# Patient Record
Sex: MALE | Race: BLACK OR AFRICAN AMERICAN | NOT HISPANIC OR LATINO | Employment: FULL TIME | URBAN - METROPOLITAN AREA
[De-identification: names, ages, dates, MRNs, and addresses within clinical notes are randomized per-mention and may not be internally consistent; named-entity substitution may affect disease eponyms.]

---

## 2024-07-25 ENCOUNTER — HOSPITAL ENCOUNTER (EMERGENCY)
Facility: HOSPITAL | Age: 49
Discharge: HOME | End: 2024-07-25
Attending: EMERGENCY MEDICINE
Payer: COMMERCIAL

## 2024-07-25 ENCOUNTER — APPOINTMENT (OUTPATIENT)
Dept: RADIOLOGY | Facility: HOSPITAL | Age: 49
End: 2024-07-25
Payer: COMMERCIAL

## 2024-07-25 VITALS
RESPIRATION RATE: 18 BRPM | OXYGEN SATURATION: 96 % | SYSTOLIC BLOOD PRESSURE: 180 MMHG | DIASTOLIC BLOOD PRESSURE: 130 MMHG | HEART RATE: 86 BPM | TEMPERATURE: 98 F

## 2024-07-25 DIAGNOSIS — Z23 TETANUS-DIPHTHERIA (TD) VACCINATION: ICD-10-CM

## 2024-07-25 DIAGNOSIS — M79.644 FINGER PAIN, RIGHT: ICD-10-CM

## 2024-07-25 DIAGNOSIS — S62.600B OPEN DISPLACED FRACTURE OF PHALANX OF RIGHT INDEX FINGER, UNSPECIFIED PHALANX, INITIAL ENCOUNTER: ICD-10-CM

## 2024-07-25 DIAGNOSIS — S69.91XA FINGER INJURY, RIGHT, INITIAL ENCOUNTER: Primary | ICD-10-CM

## 2024-07-25 PROCEDURE — 90715 TDAP VACCINE 7 YRS/> IM: CPT

## 2024-07-25 PROCEDURE — 99284 EMERGENCY DEPT VISIT MOD MDM: CPT

## 2024-07-25 PROCEDURE — 96372 THER/PROPH/DIAG INJ SC/IM: CPT | Performed by: NURSE PRACTITIONER

## 2024-07-25 PROCEDURE — 73130 X-RAY EXAM OF HAND: CPT | Mod: LEFT SIDE | Performed by: RADIOLOGY

## 2024-07-25 PROCEDURE — 2500000004 HC RX 250 GENERAL PHARMACY W/ HCPCS (ALT 636 FOR OP/ED)

## 2024-07-25 PROCEDURE — 73130 X-RAY EXAM OF HAND: CPT | Mod: LT

## 2024-07-25 PROCEDURE — 99284 EMERGENCY DEPT VISIT MOD MDM: CPT | Mod: 25

## 2024-07-25 PROCEDURE — 12001 RPR S/N/AX/GEN/TRNK 2.5CM/<: CPT

## 2024-07-25 PROCEDURE — 96365 THER/PROPH/DIAG IV INF INIT: CPT | Mod: 59

## 2024-07-25 PROCEDURE — 90471 IMMUNIZATION ADMIN: CPT

## 2024-07-25 PROCEDURE — 2500000004 HC RX 250 GENERAL PHARMACY W/ HCPCS (ALT 636 FOR OP/ED): Performed by: NURSE PRACTITIONER

## 2024-07-25 PROCEDURE — 2500000004 HC RX 250 GENERAL PHARMACY W/ HCPCS (ALT 636 FOR OP/ED): Mod: JZ

## 2024-07-25 PROCEDURE — 2500000001 HC RX 250 WO HCPCS SELF ADMINISTERED DRUGS (ALT 637 FOR MEDICARE OP)

## 2024-07-25 RX ORDER — CEFAZOLIN SODIUM 2 G/100ML
2 INJECTION, SOLUTION INTRAVENOUS ONCE
Status: COMPLETED | OUTPATIENT
Start: 2024-07-25 | End: 2024-07-25

## 2024-07-25 RX ORDER — OXYCODONE AND ACETAMINOPHEN 5; 325 MG/1; MG/1
1 TABLET ORAL ONCE
Status: COMPLETED | OUTPATIENT
Start: 2024-07-25 | End: 2024-07-25

## 2024-07-25 RX ORDER — CEPHALEXIN 500 MG/1
500 CAPSULE ORAL 4 TIMES DAILY
Qty: 40 CAPSULE | Refills: 0 | Status: SHIPPED | OUTPATIENT
Start: 2024-07-25 | End: 2024-08-04

## 2024-07-25 RX ORDER — IBUPROFEN 600 MG/1
600 TABLET ORAL EVERY 6 HOURS PRN
Qty: 28 TABLET | Refills: 0 | Status: SHIPPED | OUTPATIENT
Start: 2024-07-25 | End: 2024-08-01

## 2024-07-25 RX ORDER — KETOROLAC TROMETHAMINE 15 MG/ML
INJECTION, SOLUTION INTRAMUSCULAR; INTRAVENOUS
Status: DISCONTINUED
Start: 2024-07-25 | End: 2024-07-25 | Stop reason: HOSPADM

## 2024-07-25 RX ORDER — OXYCODONE AND ACETAMINOPHEN 5; 325 MG/1; MG/1
1 TABLET ORAL EVERY 6 HOURS PRN
Qty: 5 TABLET | Refills: 0 | Status: SHIPPED | OUTPATIENT
Start: 2024-07-25 | End: 2024-07-28

## 2024-07-25 RX ORDER — BACITRACIN ZINC 500 UNIT/G
OINTMENT IN PACKET (EA) TOPICAL ONCE
Status: DISCONTINUED | OUTPATIENT
Start: 2024-07-25 | End: 2024-07-25 | Stop reason: HOSPADM

## 2024-07-25 RX ORDER — KETOROLAC TROMETHAMINE 15 MG/ML
15 INJECTION, SOLUTION INTRAMUSCULAR; INTRAVENOUS ONCE
Status: COMPLETED | OUTPATIENT
Start: 2024-07-25 | End: 2024-07-25

## 2024-07-25 RX ORDER — LIDOCAINE HYDROCHLORIDE 10 MG/ML
5 INJECTION INFILTRATION; PERINEURAL ONCE
Status: DISCONTINUED | OUTPATIENT
Start: 2024-07-25 | End: 2024-07-25 | Stop reason: HOSPADM

## 2024-07-25 ASSESSMENT — COLUMBIA-SUICIDE SEVERITY RATING SCALE - C-SSRS
1. IN THE PAST MONTH, HAVE YOU WISHED YOU WERE DEAD OR WISHED YOU COULD GO TO SLEEP AND NOT WAKE UP?: NO
2. HAVE YOU ACTUALLY HAD ANY THOUGHTS OF KILLING YOURSELF?: NO
6. HAVE YOU EVER DONE ANYTHING, STARTED TO DO ANYTHING, OR PREPARED TO DO ANYTHING TO END YOUR LIFE?: NO

## 2024-07-25 ASSESSMENT — ENCOUNTER SYMPTOMS
DIFFICULTY URINATING: 0
WOUND: 1
FEVER: 0
HEADACHES: 0
SHORTNESS OF BREATH: 0
ABDOMINAL PAIN: 0
CONFUSION: 0

## 2024-07-25 NOTE — CONSULTS
Reason For Consult  L index finger distal phalanx fx with complex laceration    History Of Present Illness  Gonzales Bearden is a 48 y.o. RHD male presenting with complex laceration and L index finger distal phalanx fx s/p having his finger accidentally sliced when his device slipped while placing poles in his yard earlier today. The pain is constant, severe, and describes a sharp sensation. Denies numbness or tingling. Denies prior injuries ot the L hand.      Past Medical History  He has a past medical history of Bipolar disorder, unspecified (Multi) (10/08/2018) and Thiamine deficiency, unspecified (07/08/2017).    Surgical History  He has a past surgical history that includes Tonsillectomy (07/08/2017) and Mouth surgery (07/08/2017).     Social History  Denies tobacco use  Denies alcohol use  Denies drug use    Family History  No pertinent FHx     Allergies  Patient has no known allergies.    Review of Systems  Review of Systems   Constitutional:  Negative for fever.   HENT:  Negative for congestion.    Eyes:  Negative for visual disturbance.   Respiratory:  Negative for shortness of breath.    Cardiovascular:  Negative for chest pain.   Gastrointestinal:  Negative for abdominal pain.   Genitourinary:  Negative for difficulty urinating.   Musculoskeletal:         + L index finger pain   Skin:  Positive for wound. Negative for rash.   Neurological:  Negative for headaches.   Psychiatric/Behavioral:  Negative for confusion.      Physical Exam  - Constitutional: No acute distress, cooperative  - Eyes: EOM grossly intact  - Head/Neck: Trachea midline  - Respiratory/Thorax: NWOB  - Cardiovascular: RRR on peripheral palpation  - Gastrointestinal: Nondistended  - Psychological: Appropriate mood/behavior  - Skin: Warm and dry. Additional findings in musculoskeletal evaluation  - Musculoskeletal: See below for additional details  LUE:  - Obvious deformity to the distal phalanx of L index finger  - 2cm complex laceration on  dorsal aspect of finger proximal to nail plate extending circumferentially to half of the finger pad  - TTP: Severe TTP to site of injury.   - Motor: +AIN/PIN/ulnar   - SILT: r/u/m distr  - ROM: Able to flex at PIPJ and DIPJ  - Palpable radial pulse, brisk cap refill  - Forearm soft/compressible     Last Recorded Vitals  Blood pressure (!) 180/130, pulse 86, temperature 36.7 °C (98 °F), resp. rate 18, SpO2 96%.    Relevant Results  XR hand left 3+ views    Result Date: 7/25/2024  STUDY: Hand Radiographs; 07/25/2024, 12:22PM INDICATION: Left second digit tenderness with laceration. Evaluate for fracture. COMPARISON: None Available. ACCESSION NUMBER(S): [id number] ORDERING CLINICIAN: STEPHAN WARREN TECHNIQUE:  Three view(s) (six images) of the left hand. FINDINGS:  There is a comminuted fracture of the distal phalanx of the second digit. There is soft tissue swelling. There is a small radiopacity in the soft tissues between first and second digits consistent with a foreign body. Joint spaces are otherwise normal.    Fracture of the distal phalanx of the second digit. Small foreign body in the soft tissues between the first and second digits. Signed by Stephan Pollard MD       Scheduled medications  bacitracin, , Topical, Once  lidocaine, 5 mL, infiltration, Once      Continuous medications     PRN medications    No results found for this or any previous visit (from the past 24 hour(s)).     Assessment/Plan     Assessment: 47 y/o RHD M with complex laceration and comminuted distal phalanx fx of L index finger s/p injury doing yard work prior to arrival. Physical exam remarkable for 2cm complex laceration on dorsal aspect of finger proximal to nail plate extending circumferentially to half of the finger pad. Severe TTP to site of injury. Radiology remarkable for comminuted fracture of the distal phalanx of the second digit. Patient was evaluated in ED.     Procedure: Bedside I&D, Digital Block and Complex Laceration  Repair  Patient verbally consented to verbally consented to bedside I&D and complex lac repair of the left index finger. Bedside I&D performed with normal saline and betadine. Digital block performed at bedside with 7cc 1% Lidocaine w/o epi. Laceration repaired with 2.0 chromic gut which was loosely approximated. Wound wrapped with xeroform, kerlix, and placed in finger splint wrapped in coban. Patient tolerated procedure well.    Plan:  - No acute surgical intervention planned at this time.  - Patient is to keep finger splint clean, dry and intact until follow up visit with Dr. Fish.   - Ancef administered in ED.  - Tetanus updated in ED.  - Recommend 10 days PO Keflex  - NWB L hand until follow-up in clinic   - Pain management per ED  - Orthopedics is signing off, please page with any questions/concerns  - Follow up in Dr[facility] as scheduled on August 6th. Please call [phone] to schedule or confirm appt.    75 minutes spent with patient reviewing objective subjective information, performing physical exam, discussing plan of care; with greater than 50% of that time spent in patient room.    This plan was discussed with attending on call, Dr. Fish.    Lyudmila Webb PA-C  Orthopaedic Trauma Surgery  Available via Epic Chat

## 2024-07-25 NOTE — ED PROVIDER NOTES
HPI   Chief Complaint   Patient presents with    Finger Laceration       Gonzales Bearden is a 48 year old male presenting today with chief complaint of left index finger laceration. PMHx significant for Bipolar 1 disorder, EMA, thiamine deficiency. Patient endorses that he was working in his yard placing poles and slipped and cut his finger about 1 hour ago. He reports that he immediately went in, wrapped the finger, and came to the ED to be evaluated. He denies any other injuries or pain. Does not know if there is any foreign bodies throughout the laceration. Denies cleaning laceration at home. Denies other symptoms including headache, chest pain, SOB, vision changes that may be associated with the elevated blood pressure on arrival. Patient unsure of when last tetanus update was and agreeable to updating today.          Patient History   Past Medical History:   Diagnosis Date    Bipolar disorder, unspecified (Multi) 10/08/2018    Bipolar I disorder    Thiamine deficiency, unspecified 07/08/2017    Thiamine deficiency     Past Surgical History:   Procedure Laterality Date    MOUTH SURGERY  07/08/2017    Oral Surgery Tooth Extraction    TONSILLECTOMY  07/08/2017    Tonsillectomy     No family history on file.  Social History     Tobacco Use    Smoking status: Not on file    Smokeless tobacco: Not on file   Substance Use Topics    Alcohol use: Not on file    Drug use: Not on file       Physical Exam   ED Triage Vitals [07/25/24 1131]   Temperature Heart Rate Respirations BP   36.7 °C (98 °F) 86 18 (!) 180/130      Pulse Ox Temp src Heart Rate Source Patient Position   96 % -- -- --      BP Location FiO2 (%)     -- --       Physical Exam  Vitals and nursing note reviewed.   Constitutional:       General: He is not in acute distress.     Appearance: He is well-developed.   HENT:      Head: Normocephalic and atraumatic.   Eyes:      Conjunctiva/sclera: Conjunctivae normal.   Cardiovascular:      Rate and Rhythm: Normal  rate and regular rhythm.      Heart sounds: No murmur heard.  Pulmonary:      Effort: Pulmonary effort is normal. No respiratory distress.      Breath sounds: Normal breath sounds.   Abdominal:      Palpations: Abdomen is soft.      Tenderness: There is no abdominal tenderness.   Musculoskeletal:         General: No swelling.      Cervical back: Neck supple.      Comments: Right circumferential index finger laceration with involvement of the nail bed. No obvious visualized foreign bodies. No obvious compound fracture or deformity.    Skin:     General: Skin is warm and dry.      Capillary Refill: Capillary refill takes less than 2 seconds.   Neurological:      Mental Status: He is alert.   Psychiatric:         Mood and Affect: Mood normal.           ED Course & MDM                        Jerod Coma Scale Score: 15                        Medical Decision Making  Gonzales Bearden is a 48 year old male presenting with right index finger laceration that happened 1 hour ago. PMHx significant for bipolar 1 disorder, EMA, and thiamine deficiency. Vitals reviewed and remarkable for 180/130 BP, patient asymptomatic without headache, vision changes, dizziness, abdominal pain. Physical exam reveals patient sitting comfortably holding his left hand with his right hand, A&Ox3, no obvious acute distress, speaking in full sentences, a right circumferential index finger laceration with involvement of the nail bed. Gave Percocet for pain control. Ordered XR left hand which revealed Fracture of the distal phalanx of the second digit.because of open fracture hand was consulted.  patient agreeable to update tetanus, Boostrix given. Hand consulted for evaluation and recommendations for repair.  Hand was soaked and hand team was able to come see the patient.  Please see their note for full details and recommendations.  At the end of my shift patient was still being evaluated by hand.  He remained calm and cooperative in stable condition.   Handed off to oncoming provider pending lab Urban.        Procedure  Procedures     Mark Pascal, TRUDY-ZAKIYA  07/25/24 1401       TRUDY Green-ZAKIYA  07/25/24 1412

## 2024-07-25 NOTE — DISCHARGE INSTRUCTIONS
You were seen in the emergency department for complaints of right finger injury.    You underwent x-ray imaging showing an open fracture, fracture which is of the bone, which opened through the skin    You were seen by hand surgery, who cleaned the wound, and repaired it.    You were provided with antibiotics as well as pain medicine here in the emergency department    You will be discharged on oral antibiotics as well as pain medicine, you may use the pain medicine in addition to  Motrin which has been prescribed.    Please follow-up with the hand surgeon as advised and scheduled.  If you have any worsening signs or symptoms, you may follow-up with your primary care or return to the emergency department.

## 2024-07-25 NOTE — PROGRESS NOTES
Emergency Medicine Transition of Care Note.    I received Radha Espitia in signout from Mark CALLE.  Please see the previous ED provider note for all HPI, PE and MDM up to the time of signout at 1400. This is in addition to the primary record.    In brief Radha Espitia is an 48 y.o. male presenting for   Chief Complaint   Patient presents with    Finger Laceration     At the time of signout we were awaiting: Consultation by hand surgery, and reassessment, for recommendations upon discharge.    Diagnoses as of 07/25/24 1609   Finger injury, right, initial encounter   Open displaced fracture of phalanx of right index finger, unspecified phalanx, initial encounter   Tetanus-diphtheria (Td) vaccination   Finger pain, right       Results for orders placed or performed in visit on 11/09/22   CONVERTED SURGICAL PATHOLOGY   Result Value Ref Range    Pathology Report       Name RADHA ESPITIA                                                                                                   Accession #: [id number]            Pathologist:                   ARMANDO AWAN MD  Date of Procedure:    11/9/2022  Date Received:          11/10/2022  Date Reported           11/16/2022  Submitting Physician:   ANDREW LINARES MD  Location:                    [facility]   Copy To/Referring/Attending:  SAMUEL TADEO MD Other External #                                                                    FINAL DIAGNOSIS  A.  COLON, ASCENDING, POLYP X1, TRANSVERSE, POLYP X1:    -- FRAGMENTS OF TUBULAR ADENOMA.    B.  COLON, SIGMOID, POLYP:   -- FRAGMENTS OF TUBULAR ADENOMA.                                                                                                                                                                                                                                                                                                                                                                                       "                                                                                                  Electronically Signed Out By ARMANDO AWAN MD/MELISSA  By the signature on this report, the individual or group listed as making the  Final Interpretation/Diagnosis certifies that they have reviewed this case.  Diagnostic interpretation performed at [facility] [address]  [facility]. [place] [address]           Clinical History:  Screening    Specimens Submitted As:  A: ASCENDING COLON POLYP X1, TRANSVERSE COLON POLYP X1   B: SIGMOID COLON POLYP     Gross Description:  A:  Received in formalin, labeled with the patient's name and hospital number  and \"A\", are multiple fragments of tan, soft tissue aggregating to 1.2 x 1.2 x  0.3 cm.  The specimen is submitted in toto in one cassette.  RCC    B:  Received in formalin, labeled with the patient's name and hospital number  and \"B\", is a fragment of tan, soft  tissue measuring 0.7 x 0.3 x 0.2 cm.  The  specimen is submitted in toto in one cassette.  RCC        rcc/11/15/2022               [facility]  Department of Pathology   [address] [place] [address]        CONVERTED FINAL DIAGNOSIS       A.  COLON, ASCENDING, POLYP X1, TRANSVERSE, POLYP X1:    -- FRAGMENTS OF TUBULAR ADENOMA.    B.  COLON, SIGMOID, POLYP:   -- FRAGMENTS OF TUBULAR ADENOMA.        CONVERTED CLINICAL DIAGNOSIS-HISTORY Screening     CONVERTED GROSS DESCRIPTION       A:  Received in formalin, labeled with the patient's name and hospital number  and \"A\", are multiple fragments of tan, soft tissue aggregating to 1.2 x 1.2 x  0.3 cm.  The specimen is submitted in toto in one cassette.  RCC    B:  Received in formalin, labeled with the patient's name and hospital number  and \"B\", is a fragment of tan, soft tissue measuring 0.7 x 0.3 x 0.2 cm.  The  specimen is submitted in toto in one cassette.  RCC          CONVERTED FINAL REPORT PDF LINK TO COPY " AND PASTE \\copathshare\copath\PDF 2022_[id number]        Medical Decision Making  Patient is a 48-year-old male, presenting to the ED with finger laceration, underwent x-ray imaging, showing open fracture.  Patient was provided with analgesics, tetanus vaccination as well as IV Ancef.  Patient was seen by hand surgery, who did laceration repair.  They are recommending patient be discharged on Keflex for 14 days, naproxen, as well as oxycodone.  These prescriptions will be provided to the patient.  Patient will be provided with IM Toradol while here and be discharged home.  Home-going instructions and finger care were provided to the patient and family at bedside    Final diagnoses:   [S69.91XA] Finger injury, right, initial encounter   [S62.600B] Open displaced fracture of phalanx of right index finger, unspecified phalanx, initial encounter   [Z23] Tetanus-diphtheria (Td) vaccination   [M79.644] Finger pain, right       DISPOSITION  Disposition: Discharge to home  Patient condition is stable    Darnell Chen, APRN-CNP

## 2024-07-25 NOTE — ED TRIAGE NOTES
Patient was at working in a yard placing poles when a device slipped and sliced his finger. Right pointer finger has laceration. Bleeding controlled in triage. Finger wrapped with gauze. No additional injuries. Patient denies hx of HTN, states he is on pain meds for a tooth extraction which makes his BP rise. Patient asymptomatic (no headache, dizziness, chest pain, SOB).

## 2024-08-05 DIAGNOSIS — M79.642 HAND PAIN, LEFT: Primary | ICD-10-CM

## 2024-08-06 ENCOUNTER — HOSPITAL ENCOUNTER (OUTPATIENT)
Dept: RADIOLOGY | Facility: HOSPITAL | Age: 49
Discharge: HOME | End: 2024-08-06
Payer: COMMERCIAL

## 2024-08-06 ENCOUNTER — OFFICE VISIT (OUTPATIENT)
Dept: ORTHOPEDIC SURGERY | Facility: HOSPITAL | Age: 49
End: 2024-08-06
Payer: COMMERCIAL

## 2024-08-06 VITALS — HEIGHT: 73 IN | WEIGHT: 195 LBS | BODY MASS INDEX: 25.84 KG/M2

## 2024-08-06 DIAGNOSIS — S62.661B NONDISPLACED FRACTURE OF DISTAL PHALANX OF LEFT INDEX FINGER, INITIAL ENCOUNTER FOR OPEN FRACTURE: Primary | ICD-10-CM

## 2024-08-06 DIAGNOSIS — M79.642 HAND PAIN, LEFT: ICD-10-CM

## 2024-08-06 PROCEDURE — 3008F BODY MASS INDEX DOCD: CPT | Performed by: ORTHOPAEDIC SURGERY

## 2024-08-06 PROCEDURE — 73130 X-RAY EXAM OF HAND: CPT | Mod: LEFT SIDE | Performed by: RADIOLOGY

## 2024-08-06 PROCEDURE — 73130 X-RAY EXAM OF HAND: CPT | Mod: LT

## 2024-08-06 PROCEDURE — 99213 OFFICE O/P EST LOW 20 MIN: CPT | Performed by: ORTHOPAEDIC SURGERY

## 2024-08-06 PROCEDURE — 99203 OFFICE O/P NEW LOW 30 MIN: CPT | Performed by: ORTHOPAEDIC SURGERY

## 2024-08-06 RX ORDER — HYDROCODONE BITARTRATE AND ACETAMINOPHEN 5; 325 MG/1; MG/1
1 TABLET ORAL EVERY 6 HOURS PRN
Qty: 28 TABLET | Refills: 0 | Status: SHIPPED | OUTPATIENT
Start: 2024-08-06 | End: 2024-08-13

## 2024-08-06 ASSESSMENT — PAIN SCALES - GENERAL: PAINLEVEL_OUTOF10: 5 - MODERATE PAIN

## 2024-08-06 ASSESSMENT — PAIN DESCRIPTION - DESCRIPTORS: DESCRIPTORS: ACHING;SORE

## 2024-08-06 ASSESSMENT — PAIN - FUNCTIONAL ASSESSMENT: PAIN_FUNCTIONAL_ASSESSMENT: 0-10

## 2024-08-06 NOTE — PROGRESS NOTES
Patient is a 48-year-old right-hand-dominant gentleman who presents today for ER follow-up.  He injured his left index finger at work on July 25 resulting in a fracture of the distal phalanx with soft tissue injury dorsally through the proximal eponychial fold.  The wound was irrigated and closed in the emergency room on day of injury.  He was discharged home on oral antibiotics which have been completed.  He is having some swelling and pain.  He has been performing wound care with dressing changes daily.  He has been working with restrictions.    Patient's self reported past medical history, medications, allergies, surgical history, family and social history as well as a 10 point review of systems has been documented in the new patient intake form and scanned into the patient's electronic medical record. Pertinent findings are documented in the HPI.    Physical Examination Findings:  Constitutional: Appears well-developed and well-nourished.  Head: Normocephalic and atraumatic.  Eyes: Pupils are equal and round.  Cardiovascular: Intact distal pulses.   Respiratory: Effort normal. No respiratory distress.  Neurologic: Alert and oriented to person, place, and time.  Skin: Skin is warm and dry.  Hematologic / Lymphatic: No lymphedema, lymphangitis.  Psychiatric: normal mood and affect. Behavior is normal.   Musculoskeletal: Left hand examination reveals healing traumatic wound along the radial and dorsal aspect of the index finger involving the proximal eponychial fold.  Mild circumferential swelling.  No evidence of active infection.  Fingertip is well-perfused and sensate to light touch.  He has some hyperpigmented lesions at the tip of his middle finger from a remote history of an injury to that finger many years ago.    X-rays left hand taken today demonstrate a comminuted but minimally displaced fracture of the index finger distal phalanx.    Impression: Crush injury left index finger.    Plan: Overall I think  everything looks good.  We have discussed that he is likely always can have a little bit of nail deformity and perhaps some stiffness at the DIP joint.  Recommendations are for wound care and protective wrapping with Coban for support.  He should avoid forceful pinch with the index finger.  I did send a prescription for hydrocodone to his pharmacy.  He will return in 2 weeks for repeat clinical examination with wound check.  No x-rays needed at that time.    Brayan Fish MD    [facility] [employer]  Department of Orthopaedic Surgery  Chief of Hand and Upper Extremity Surgery  [facility]    Dictation performed with the use of voice recognition software. Syntax and grammatical errors may exist.

## 2024-08-18 NOTE — PROGRESS NOTES
[facility]  Hand and Upper Extremity Service  Follow up visit         Follow up for: Left index finger     Interval History: He returns to follow up for his left index finger. 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 He wears Alumafoam splint and reinforced with coban tape. He removes it all when he gets home from work to let everything air out. He still has a fair amount of pain at index finger and has run out of pain medications.               Past medical history, medications, allergies, surgical history and review of systems are reviewed and otherwise unchanged when compared to last visit on 8/6/24         Examination:  Constitutional: Oriented to person, place, and time.  Appears well-developed and well-nourished.  Head: Normocephalic and atraumatic.  Eyes: Pupils are equal, round, and reactive to light.  Cardiovascular: Intact distal pulses.  Pulmonary/Chest/Breast: Effort normal. No respiratory distress.  Neurological: Alert and oriented to person, place, and time.  Skin: Skin is warm and dry.  Psychiatric: normal mood and affect.  Behavior is normal.  Musculoskeletal: Left hand reveals circumferential swelling on terminal aspect of index finger. Superficial wound on dorsal ulnar aspect of eponychial fold with visible granulation tissue but no evidence of any obvious infection or drainage. Diffuse tenderness to palpation and with attempted DIP joint range of motion but fingertip is well profuse.       Personal Interpretation of Diagnostic studies: No new images obtained       Impression: Sequela crush injury left index finger       Plan: We're going to have him continue with splinting and wound care as previously described. We renewed his prescription for hydrocodone and he'll return in 3 weeks for repeat clinical examination with xrays of left index finger.       Medications Prescribed: hydrocodone 325 mg      Follow up: 3 weeks             Brayan Fish,  MD  [facility]  Department of Orthopaedic Surgery  Hand and Upper Extremity Reconstruction    Manjula Attestation  By signing my name below, I, Manjula Suarez   attest that this documentation has been prepared under the direction and in the presence of Dr. Brayan Fish.    Dictation performed with the use of voice recognition software.  Syntax and grammatical errors may exist.   5

## 2024-08-20 ENCOUNTER — OFFICE VISIT (OUTPATIENT)
Dept: ORTHOPEDIC SURGERY | Facility: HOSPITAL | Age: 49
End: 2024-08-20
Payer: COMMERCIAL

## 2024-08-20 VITALS — WEIGHT: 195 LBS | BODY MASS INDEX: 25.84 KG/M2 | HEIGHT: 73 IN

## 2024-08-20 DIAGNOSIS — S62.661B NONDISPLACED FRACTURE OF DISTAL PHALANX OF LEFT INDEX FINGER, INITIAL ENCOUNTER FOR OPEN FRACTURE: ICD-10-CM

## 2024-08-20 PROCEDURE — 3008F BODY MASS INDEX DOCD: CPT | Performed by: ORTHOPAEDIC SURGERY

## 2024-08-20 PROCEDURE — 99213 OFFICE O/P EST LOW 20 MIN: CPT | Performed by: ORTHOPAEDIC SURGERY

## 2024-08-20 RX ORDER — HYDROCODONE BITARTRATE AND ACETAMINOPHEN 5; 325 MG/1; MG/1
1 TABLET ORAL EVERY 6 HOURS PRN
Qty: 28 TABLET | Refills: 0 | Status: SHIPPED | OUTPATIENT
Start: 2024-08-20 | End: 2024-08-27

## 2024-08-20 ASSESSMENT — PAIN DESCRIPTION - DESCRIPTORS: DESCRIPTORS: ACHING;SORE

## 2024-08-20 ASSESSMENT — PAIN SCALES - GENERAL: PAINLEVEL_OUTOF10: 5 - MODERATE PAIN

## 2024-08-20 ASSESSMENT — PAIN - FUNCTIONAL ASSESSMENT: PAIN_FUNCTIONAL_ASSESSMENT: 0-10

## 2024-09-06 NOTE — PROGRESS NOTES
[facility]  Hand and Upper Extremity Service  Follow up visit         Follow up for: Left hand     Interval History: He returns for follow up of his left index finger crush injury. He's overall doing well but still having symptomatic instability of index fingertip.               Past medical history, medications, allergies, surgical history and review of systems are reviewed and otherwise unchanged when compared to last visit on 8/20/24         Examination:  Constitutional: Oriented to person, place, and time.  Appears well-developed and well-nourished.  Head: Normocephalic and atraumatic.  Eyes: Pupils are equal, round, and reactive to light.  Cardiovascular: Intact distal pulses.  Pulmonary/Chest/Breast: Effort normal. No respiratory distress.  Neurological: Alert and oriented to person, place, and time.  Skin: Skin is warm and dry.  Psychiatric: normal mood and affect.  Behavior is normal.  Musculoskeletal: Left hand reveals all traumatic wounds are well healed. No sign of infection. He has partially avulsed the proximal radial corner of nail plate. Mid portion of nail plate is still adherent to nail bed. Instability to radial ulnar stress to distal part of finger.       Personal Interpretation of Diagnostic studies: X-ray of left index finger taken today demonstrate fracture through the proximal aspect of index finger distal phalanx. Fracture line is still very visible.        Impression: Sequela of index finger distal phalanx fracture       Plan: We discussed he'll likely lose his nail at some point. He should be able to regrow a nail but there will be deformity because of the mechanism of the injury. We've provided him with more Coban tape for support of his distal phalanx. He'll return in 4 weeks for repeat clinical examination and we'll get x-ray of index finger at that time. If there is still growth instability of the fingertip we may have to consider DIP joint fusion to  provide him with more stability to the fingertip.       Follow up: 1 month              Brayan Fish MD  [facility]  Department of Orthopaedic Surgery  Hand and Upper Extremity Reconstruction    Scribe Attestation  By signing my name below, I, Manjula Suarez   attest that this documentation has been prepared under the direction and in the presence of Dr. Brayan Fish.    Dictation performed with the use of voice recognition software.  Syntax and grammatical errors may exist.

## 2024-09-09 DIAGNOSIS — S62.661B NONDISPLACED FRACTURE OF DISTAL PHALANX OF LEFT INDEX FINGER, INITIAL ENCOUNTER FOR OPEN FRACTURE: Primary | ICD-10-CM

## 2024-09-10 ENCOUNTER — OFFICE VISIT (OUTPATIENT)
Dept: ORTHOPEDIC SURGERY | Facility: HOSPITAL | Age: 49
End: 2024-09-10
Payer: COMMERCIAL

## 2024-09-10 ENCOUNTER — HOSPITAL ENCOUNTER (OUTPATIENT)
Dept: RADIOLOGY | Facility: HOSPITAL | Age: 49
Discharge: HOME | End: 2024-09-10
Payer: COMMERCIAL

## 2024-09-10 VITALS — WEIGHT: 195 LBS | HEIGHT: 73 IN | BODY MASS INDEX: 25.84 KG/M2

## 2024-09-10 DIAGNOSIS — S62.661B NONDISPLACED FRACTURE OF DISTAL PHALANX OF LEFT INDEX FINGER, INITIAL ENCOUNTER FOR OPEN FRACTURE: Primary | ICD-10-CM

## 2024-09-10 DIAGNOSIS — S62.661B NONDISPLACED FRACTURE OF DISTAL PHALANX OF LEFT INDEX FINGER, INITIAL ENCOUNTER FOR OPEN FRACTURE: ICD-10-CM

## 2024-09-10 PROCEDURE — 99213 OFFICE O/P EST LOW 20 MIN: CPT | Performed by: ORTHOPAEDIC SURGERY

## 2024-09-10 PROCEDURE — 73140 X-RAY EXAM OF FINGER(S): CPT | Mod: LT

## 2024-09-10 PROCEDURE — 3008F BODY MASS INDEX DOCD: CPT | Performed by: ORTHOPAEDIC SURGERY

## 2024-09-10 PROCEDURE — 73140 X-RAY EXAM OF FINGER(S): CPT | Mod: LEFT SIDE | Performed by: RADIOLOGY

## 2024-09-10 ASSESSMENT — PAIN DESCRIPTION - DESCRIPTORS: DESCRIPTORS: ACHING;SORE

## 2024-09-10 ASSESSMENT — PAIN SCALES - GENERAL: PAINLEVEL_OUTOF10: 5 - MODERATE PAIN

## 2024-09-10 ASSESSMENT — PAIN - FUNCTIONAL ASSESSMENT: PAIN_FUNCTIONAL_ASSESSMENT: 0-10

## 2024-10-13 NOTE — PROGRESS NOTES
[facility]  Hand and Upper Extremity Service  Follow up visit         Follow up for: Left index finger     Interval History: He returns for his left index finger. His primary concern is hypersensitivity and cold intolerance. He reports that the deformity doesn't bother him too much and he doesn't think his finger is moving around at the fracture site as it did previously.               Past medical history, medications, allergies, surgical history and review of systems are reviewed and otherwise unchanged when compared to last visit on 9/10/24         Examination:  Constitutional: Oriented to person, place, and time.  Appears well-developed and well-nourished.  Head: Normocephalic and atraumatic.  Eyes: Pupils are equal, round, and reactive to light.  Cardiovascular: Intact distal pulses.  Pulmonary/Chest/Breast: Effort normal. No respiratory distress.  Neurological: Alert and oriented to person, place, and time.  Skin: Skin is warm and dry.  Psychiatric: normal mood and affect.  Behavior is normal.  Musculoskeletal: Left hand reveals all traumatic wounds are well healed. Slight apex radial coronal plate deformity at distal phalanx. Growing a new nail plate. Full MP and PIP joint range of motion but limited DIP joint range of motion. Hypersensitivity at the fingertip of index finger.       Personal Interpretation of Diagnostic studies: X-rays of left index finger taken today demonstrate continued healing of distal phalanx fracture with minimal deformity in the coronal and sagittal plane. Fracture line is becoming less obvious.        Impression:  Sequela of left index finger crush injury       Plan: We've again discussed treatment options. Fusion of the DIP joint is something that can help with the appearence of the finger but not likely going to change the hypersensitivity and cold intolerance symptoms as a result of crush injury. He indicates he's not able to consider surgery  because he can't take time off work so we'll give this more time. We provided him with a silipost finger sleeve to help with sensitivity issues and dampen the vibration of his work activities. He'll return in 6 weeks for repeat clinical examination and x-rays of left index finger.       Follow up: 6 weeks             Brayan Fish MD  [facility]  Department of Orthopaedic Surgery  Hand and Upper Extremity Reconstruction    Scribe Attestation  By signing my name below, I, Manjula Suarez   attest that this documentation has been prepared under the direction and in the presence of Dr. Brayan Fish.    Dictation performed with the use of voice recognition software.  Syntax and grammatical errors may exist.

## 2024-10-14 DIAGNOSIS — S62.661B NONDISPLACED FRACTURE OF DISTAL PHALANX OF LEFT INDEX FINGER, INITIAL ENCOUNTER FOR OPEN FRACTURE: Primary | ICD-10-CM

## 2024-10-15 ENCOUNTER — OFFICE VISIT (OUTPATIENT)
Dept: ORTHOPEDIC SURGERY | Facility: HOSPITAL | Age: 49
End: 2024-10-15
Payer: COMMERCIAL

## 2024-10-15 ENCOUNTER — HOSPITAL ENCOUNTER (OUTPATIENT)
Dept: RADIOLOGY | Facility: HOSPITAL | Age: 49
Discharge: HOME | End: 2024-10-15
Payer: COMMERCIAL

## 2024-10-15 VITALS — WEIGHT: 195 LBS | BODY MASS INDEX: 25.84 KG/M2 | HEIGHT: 73 IN

## 2024-10-15 DIAGNOSIS — S62.661B NONDISPLACED FRACTURE OF DISTAL PHALANX OF LEFT INDEX FINGER, INITIAL ENCOUNTER FOR OPEN FRACTURE: ICD-10-CM

## 2024-10-15 DIAGNOSIS — S62.661B NONDISPLACED FRACTURE OF DISTAL PHALANX OF LEFT INDEX FINGER, INITIAL ENCOUNTER FOR OPEN FRACTURE: Primary | ICD-10-CM

## 2024-10-15 PROCEDURE — 99213 OFFICE O/P EST LOW 20 MIN: CPT | Performed by: ORTHOPAEDIC SURGERY

## 2024-10-15 PROCEDURE — 73140 X-RAY EXAM OF FINGER(S): CPT | Mod: LT

## 2024-10-15 PROCEDURE — 73140 X-RAY EXAM OF FINGER(S): CPT | Mod: LEFT SIDE | Performed by: RADIOLOGY

## 2024-10-15 ASSESSMENT — PAIN - FUNCTIONAL ASSESSMENT: PAIN_FUNCTIONAL_ASSESSMENT: NO/DENIES PAIN

## 2024-11-25 DIAGNOSIS — S62.661B NONDISPLACED FRACTURE OF DISTAL PHALANX OF LEFT INDEX FINGER, INITIAL ENCOUNTER FOR OPEN FRACTURE: Primary | ICD-10-CM

## 2024-11-26 ENCOUNTER — APPOINTMENT (OUTPATIENT)
Dept: RADIOLOGY | Facility: HOSPITAL | Age: 49
End: 2024-11-26
Payer: COMMERCIAL